# Patient Record
Sex: FEMALE | Race: WHITE | NOT HISPANIC OR LATINO | URBAN - METROPOLITAN AREA
[De-identification: names, ages, dates, MRNs, and addresses within clinical notes are randomized per-mention and may not be internally consistent; named-entity substitution may affect disease eponyms.]

---

## 2020-01-01 ENCOUNTER — INPATIENT (INPATIENT)
Facility: HOSPITAL | Age: 85
LOS: 0 days | DRG: 951 | End: 2020-04-19
Attending: INTERNAL MEDICINE | Admitting: INTERNAL MEDICINE
Payer: MEDICARE

## 2020-01-01 ENCOUNTER — EMERGENCY (EMERGENCY)
Facility: HOSPITAL | Age: 85
LOS: 1 days | Discharge: NURSING, REHAB OR LONG-TERM CARE | End: 2020-01-01
Attending: EMERGENCY MEDICINE | Admitting: EMERGENCY MEDICINE
Payer: MEDICARE

## 2020-01-01 VITALS
SYSTOLIC BLOOD PRESSURE: 70 MMHG | WEIGHT: 110.01 LBS | DIASTOLIC BLOOD PRESSURE: 38 MMHG | HEART RATE: 142 BPM | OXYGEN SATURATION: 88 % | RESPIRATION RATE: 32 BRPM | HEIGHT: 60 IN | TEMPERATURE: 99 F

## 2020-01-01 VITALS
WEIGHT: 100.09 LBS | TEMPERATURE: 98 F | SYSTOLIC BLOOD PRESSURE: 157 MMHG | RESPIRATION RATE: 18 BRPM | HEART RATE: 76 BPM | OXYGEN SATURATION: 97 % | DIASTOLIC BLOOD PRESSURE: 77 MMHG | HEIGHT: 60 IN

## 2020-01-01 VITALS
OXYGEN SATURATION: 100 % | DIASTOLIC BLOOD PRESSURE: 17 MMHG | HEART RATE: 121 BPM | RESPIRATION RATE: 17 BRPM | SYSTOLIC BLOOD PRESSURE: 38 MMHG

## 2020-01-01 VITALS
TEMPERATURE: 97 F | SYSTOLIC BLOOD PRESSURE: 163 MMHG | OXYGEN SATURATION: 99 % | HEART RATE: 80 BPM | DIASTOLIC BLOOD PRESSURE: 94 MMHG | RESPIRATION RATE: 18 BRPM

## 2020-01-01 DIAGNOSIS — J96.00 ACUTE RESPIRATORY FAILURE, UNSPECIFIED WHETHER WITH HYPOXIA OR HYPERCAPNIA: ICD-10-CM

## 2020-01-01 DIAGNOSIS — R41.82 ALTERED MENTAL STATUS, UNSPECIFIED: ICD-10-CM

## 2020-01-01 DIAGNOSIS — Z51.5 ENCOUNTER FOR PALLIATIVE CARE: ICD-10-CM

## 2020-01-01 LAB — SARS-COV-2 RNA SPEC QL NAA+PROBE: DETECTED

## 2020-01-01 PROCEDURE — 72125 CT NECK SPINE W/O DYE: CPT | Mod: 26

## 2020-01-01 PROCEDURE — 99284 EMERGENCY DEPT VISIT MOD MDM: CPT

## 2020-01-01 PROCEDURE — 70450 CT HEAD/BRAIN W/O DYE: CPT | Mod: 26

## 2020-01-01 PROCEDURE — 12011 RPR F/E/E/N/L/M 2.5 CM/<: CPT

## 2020-01-01 PROCEDURE — 99284 EMERGENCY DEPT VISIT MOD MDM: CPT | Mod: 25

## 2020-01-01 PROCEDURE — 72125 CT NECK SPINE W/O DYE: CPT

## 2020-01-01 PROCEDURE — 70450 CT HEAD/BRAIN W/O DYE: CPT

## 2020-01-01 PROCEDURE — 99285 EMERGENCY DEPT VISIT HI MDM: CPT | Mod: CS

## 2020-01-01 RX ORDER — MIRTAZAPINE 45 MG/1
1 TABLET, ORALLY DISINTEGRATING ORAL
Qty: 0 | Refills: 0 | DISCHARGE

## 2020-01-01 RX ORDER — ACETAMINOPHEN 500 MG
650 TABLET ORAL EVERY 6 HOURS
Refills: 0 | Status: DISCONTINUED | OUTPATIENT
Start: 2020-01-01 | End: 2020-01-01

## 2020-01-01 RX ORDER — ACETAMINOPHEN 500 MG
2 TABLET ORAL
Qty: 0 | Refills: 0 | DISCHARGE

## 2020-01-01 RX ORDER — HYDROMORPHONE HYDROCHLORIDE 2 MG/ML
1 INJECTION INTRAMUSCULAR; INTRAVENOUS; SUBCUTANEOUS
Refills: 0 | Status: DISCONTINUED | OUTPATIENT
Start: 2020-01-01 | End: 2020-01-01

## 2020-01-01 RX ORDER — OMEPRAZOLE 10 MG/1
1 CAPSULE, DELAYED RELEASE ORAL
Qty: 0 | Refills: 0 | DISCHARGE

## 2020-01-01 RX ORDER — ATENOLOL 25 MG/1
1 TABLET ORAL
Qty: 0 | Refills: 0 | DISCHARGE

## 2020-01-01 RX ORDER — THIAMINE MONONITRATE (VIT B1) 100 MG
1 TABLET ORAL
Qty: 0 | Refills: 0 | DISCHARGE

## 2020-01-01 RX ORDER — CALCITONIN SALMON 200 [IU]/ML
0 INJECTION, SOLUTION INTRAMUSCULAR
Qty: 0 | Refills: 0 | DISCHARGE

## 2020-01-01 RX ORDER — VENLAFAXINE HCL 75 MG
1 CAPSULE, EXT RELEASE 24 HR ORAL
Qty: 0 | Refills: 0 | DISCHARGE

## 2020-01-01 RX ORDER — ASCORBIC ACID 60 MG
1 TABLET,CHEWABLE ORAL
Qty: 0 | Refills: 0 | DISCHARGE

## 2020-01-01 RX ORDER — ZINC SULFATE TAB 220 MG (50 MG ZINC EQUIVALENT) 220 (50 ZN) MG
0 TAB ORAL
Qty: 0 | Refills: 0 | DISCHARGE

## 2020-01-01 RX ORDER — LANOLIN ALCOHOL/MO/W.PET/CERES
1 CREAM (GRAM) TOPICAL
Qty: 0 | Refills: 0 | DISCHARGE

## 2020-01-01 RX ORDER — SODIUM CHLORIDE 9 MG/ML
0 INJECTION INTRAMUSCULAR; INTRAVENOUS; SUBCUTANEOUS
Qty: 0 | Refills: 0 | DISCHARGE

## 2020-01-01 RX ORDER — TRAZODONE HCL 50 MG
0.5 TABLET ORAL
Qty: 0 | Refills: 0 | DISCHARGE

## 2020-01-01 RX ORDER — LEVOTHYROXINE SODIUM 125 MCG
1 TABLET ORAL
Qty: 0 | Refills: 0 | DISCHARGE

## 2020-01-01 RX ORDER — PREGABALIN 225 MG/1
1 CAPSULE ORAL
Qty: 0 | Refills: 0 | DISCHARGE

## 2020-01-01 RX ORDER — ALPRAZOLAM 0.25 MG
1 TABLET ORAL
Qty: 0 | Refills: 0 | DISCHARGE

## 2020-01-01 RX ADMIN — HYDROMORPHONE HYDROCHLORIDE 1 MILLIGRAM(S): 2 INJECTION INTRAMUSCULAR; INTRAVENOUS; SUBCUTANEOUS at 12:52

## 2020-04-12 NOTE — ED PROVIDER NOTE - CLINICAL SUMMARY MEDICAL DECISION MAKING FREE TEXT BOX
Pt is a 89 yo female s/p fall DNR DNI will get ct head neck laceration repair  at baseline and vitals stable in er not requiring O2

## 2020-04-12 NOTE — ED PROVIDER NOTE - NSFOLLOWUPINSTRUCTIONS_ED_ALL_ED_FT
Possible cervical spine fracture seen on ct scan pt comfort care only   use c-collar for comfort   suture removal 5 days   return to er for any worsening symptoms

## 2020-04-12 NOTE — ED ADULT NURSE NOTE - CAS EDN DISCHARGE ASSESSMENT
Patient baseline mental status No adverse reaction to first time med in ED/Patient baseline mental status

## 2020-04-12 NOTE — ED PROVIDER NOTE - CONSTITUTIONAL, MLM
normal... Well appearing, awake, alert, not oriented to person, place, time/situation responds to touch and in no apparent distress 2 cm laceration left forehead

## 2020-04-12 NOTE — ED ADULT NURSE NOTE - OBJECTIVE STATEMENT
89 yo male presents to the ED BIBA from [facility] with complaints of fall , laceration to left side of head , pt does not respond to name but responds to pain.

## 2020-04-12 NOTE — ED PROVIDER NOTE - ATTENDING CONTRIBUTION TO CARE
89 F with severe dementia sent from [facility] for eval of forehead lac after a fall. Pt unable to give any hx.  PE VSS, NAD, Afebrile, Pulse ox 99% on RA.  $cm lac left forehead, no bony deformity. Neuro unable to assess due to chronic unresponsive state. Plan - Sutures, CT brain, if neg for bleed return to Assisted living.    I performed a history and physical exam of the patient and discussed their management with the advanced care provider. I reviewed the advanced care provider's note and agree with the documented findings and plan of care. My medical decision making and objective findings are found above.

## 2020-04-12 NOTE — ED PROVIDER NOTE - OBJECTIVE STATEMENT
Pt is a 89 yo female with pmhx of dementia hip fracture alzheimer's htn depression and hypothyroidism BIBEMS from [facility] assisted living s/p trip and fall laceration to left forehead no loc. Pt is at baseline mental status as per ems no blood thinners DNI DNR comfort care only. rest of history limited due to mental status history obtained from paperwork.    pcp [place]

## 2020-04-12 NOTE — ED PROVIDER NOTE - PATIENT PORTAL LINK FT
You can access the FollowMyHealth Patient Portal offered by [facility] by registering at the following website: [email or web address]. By joining [employer]’s FollowMyHealth portal, you will also be able to view your health information using other applications (apps) compatible with our system.

## 2020-04-12 NOTE — ED ADULT NURSE NOTE - NSIMPLEMENTINTERV_GEN_ALL_ED
Implemented All Fall with Harm Risk Interventions:  [place] to call system. Call bell, personal items and telephone within reach. Instruct patient to call for assistance. Room bathroom lighting operational. Non-slip footwear when patient is off stretcher. Physically safe environment: no spills, clutter or unnecessary equipment. Stretcher in lowest position, wheels locked, appropriate side rails in place. Provide visual cue, wrist band, yellow gown, etc. Monitor gait and stability. Monitor for mental status changes and reorient to person, place, and time. Review medications for side effects contributing to fall risk. Reinforce activity limits and safety measures with patient and family. Provide visual clues: red socks.

## 2020-04-12 NOTE — ED PROVIDER NOTE - PROGRESS NOTE DETAILS
CT C-spine without contrast  1. Age-indeterminate minimally displaced type II dens fracture with mildly displaced anterior C1 ring fracture. Mild prominence of the prevertebral soft tissues in this region without convincing loss of the prevertebral fat stripe to confirm acuity. Presence of a pacemaker limits MRI assessment.  2. Multilevel degenerative changes, greatest at C5-C6 and C6-C7, as detailed above. results appreciated + pulse ox on forehead seen in CT scan  ct c-spine likely old but no old imaging pt is comfort care only pt nonverbal no further work up indicated at this time will dc with soft collar will d/c back to assisted living results appreciated + pulse ox on forehead seen in CT scan  ct c-spine likely old but no old imaging pt is comfort care only pt nonverbal no further work up indicated at this time will dc with cervical collar will d/c back to assisted living

## 2020-04-19 PROBLEM — I10 ESSENTIAL (PRIMARY) HYPERTENSION: Chronic | Status: ACTIVE | Noted: 2020-01-01

## 2020-04-19 PROBLEM — F41.9 ANXIETY DISORDER, UNSPECIFIED: Chronic | Status: ACTIVE | Noted: 2020-01-01

## 2020-04-19 PROBLEM — E03.9 HYPOTHYROIDISM, UNSPECIFIED: Chronic | Status: ACTIVE | Noted: 2020-01-01

## 2020-04-19 PROBLEM — F03.90 UNSPECIFIED DEMENTIA WITHOUT BEHAVIORAL DISTURBANCE: Chronic | Status: ACTIVE | Noted: 2020-01-01

## 2020-04-19 PROBLEM — E87.1 HYPO-OSMOLALITY AND HYPONATREMIA: Chronic | Status: ACTIVE | Noted: 2020-01-01

## 2020-04-19 NOTE — H&P ADULT - NSHPPHYSICALEXAM_GEN_ALL_CORE
PHYSICAL EXAM:elderly female, frail       Constitutional: frail, unresposive to verbal stimuli    Eyes:discharge or conjunctival injection    ENMT:No pharyngeal exudate or erythema.    Neck: C-collar in place       Respiratory: poor  air entry bilaterally    Cardiovascular:S1 S2 wnl,    Gastrointestinal:Soft BS(+)     Rectal: Deferred     Extremities: mottling bilateral LE,    Vascular: capillary refill >2s    Neurological:AAO X 0       Lymph Nodes:No palpable LNs    Musculoskeletal: functional quadriplegia

## 2020-04-19 NOTE — ED ADULT NURSE NOTE - OBJECTIVE STATEMENT
Patient with history of dementia, brought from nursing home for unresponsiveness. Patient presents unresponsive, mottled, cyanotic, hypotensive and hypoxic with cool extremities and weak peripheral pulses Patient with history of dementia, brought from nursing home for unresponsiveness. Patient presents unresponsive, mottled, cyanotic, hypotensive and hypoxic with cool extremities and weak peripheral pulses. Old scabbed abrasion left upper forehead and patient presents wearing a hard collar for prior c-spine fracture.

## 2020-04-19 NOTE — ED ADULT NURSE NOTE - NS PRO AD PATIENT TYPE
Health Care Proxy (HCP)/DNI/Medical Orders for Life-Sustaining Treatment (MOLST)/Do Not Resuscitate (DNR)

## 2020-04-19 NOTE — ED ADULT NURSE NOTE - PMH
Anxiety    Dementia    Hypertension    Hyponatremia    Hypothyroidism Anxiety    Cervical spine fracture  C-1 s/p fall 4/12/2020  Dementia    Hypertension    Hyponatremia    Hypothyroidism

## 2020-04-19 NOTE — ED PROVIDER NOTE - OBJECTIVE STATEMENT
BIBEMS from [facility] for unresponsiveness today.  As per Rolly, DNR/DNI, no lab, but trial of iv fluid and comfort measures.  pt is tachycardic and unresponsive to pain with her eyes opened.

## 2020-04-19 NOTE — DISCHARGE NOTE FOR THE EXPIRED PATIENT - HOSPITAL COURSE
Pt was a 89 female with a PMH of dementia residing at [facility]. She had recent ER visit with cervical spine fracture s/p fall. She presented to ED on  with altered mental status, hypoxia and hypotension. COVID 19 PCR was positive. Family contacted and chose comfort care/palliative care. No further labs, no IVF, no artificial nutrition, DNR/I.  Pts grave prognosis discussed with son. She was admitted for respiratory failure due to COVID 19 and placed on palliative care with supplemental O2 via NRB and PRN opiates and anxiolytics ordered. She  peacefully at 4:02pm on 2020.

## 2020-04-19 NOTE — CONSULT NOTE ADULT - SUBJECTIVE AND OBJECTIVE BOX
Date/Time Patient Seen:  		  Referring MD:   Data Reviewed	       Patient is a 89y old  Female who presents with a chief complaint of     Subjective/HPI    in bed  seen and examined  vs and HD and sat reviewed  on NRB  imaging reviewed    er provider note reviewed    MOLST reviewed         PAST MEDICAL & SURGICAL HISTORY:  Hypertension  Hypothyroidism  Hyponatremia  Anxiety  Dementia        Medication list         MEDICATIONS  (STANDING):    MEDICATIONS  (PRN):  HYDROmorphone  Injectable 1 milliGRAM(s) IV Push every 30 minutes PRN pain, distress, suffering, palliative measures         Vitals log        ICU Vital Signs Last 24 Hrs  T(C): 37.1 (19 Apr 2020 12:15), Max: 37.1 (19 Apr 2020 12:15)  T(F): 98.8 (19 Apr 2020 12:15), Max: 98.8 (19 Apr 2020 12:15)  HR: 145 (19 Apr 2020 12:20) (142 - 145)  BP: 64/45 (19 Apr 2020 12:20) (64/45 - 70/38)  BP(mean): --  ABP: --  ABP(mean): --  RR: 32 (19 Apr 2020 12:20) (32 - 32)  SpO2: 98% (19 Apr 2020 12:20) (88% - 98%)           Input and Output:  I&O's Detail      Lab Data                  Review of Systems	  frail  weak      Objective     Physical Examination    frail  weak  lung dec BS  heart s1s2      Pertinent Lab findings & Imaging      [place]:  NO   Adequate UO     I&O's Detail           Discussed with:     Cultures:	        Radiology        EXAM:  CT CERVICAL SPINE                          EXAM:  CT BRAIN                                  PROCEDURE DATE:  04/12/2020          INTERPRETATION:  CT HEAD, CT CERVICAL SPINE     History:  89-year-old; fall.    Technique:  Computed tomography of the head and cervical spine was performed without intravenous contrast.    Comparison:  None.    Findings:    Head CT   Brain: No acute intracranial hemorrhage. Encephalomalacia from an old infarct involves much of the right temporal lobe and right occipital lobe and to a lesser extent adjoining right parietal lobe is noted along with marginal gliosis. There is ex vacuo dilatation of the right temporal horn consistent with chronicity. Moderate chronic white matter microvascular ischemic changes are present. No large mass, mass effect, or midline shift is seen. The midline structures are unremarkable. The brain demonstrates unremarkable morphology and volume for age.    Ventricles: No hydrocephalus.  Extra-axial spaces: No subdural or epidural collection. The convexity sulci and basal cisterns are preserved.   Vascular, intracranial: Mild intracranial atherosclerotic changes are noted.    Surgical Changes: None.   Calvarium: The calvarium is intact without focal osseous pathology. No significant scalp contusion identified. A lead is noted to overlie the left frontal scalp without evidence of craniotomy defect or significant subcutaneous extent.  Skull base: The middle ears and mastoid air cells are clear. TMJs are aligned. Cerumen partially impacts the right external auditory canal.    Orbits/facial bones: Non dedicated views of the orbits are grossly unremarkable. No visualized fracture.   Paranasal sinuses: The visualized paranasal sinuses are well aerated. Mild leftward deviation the bony nasal septum.    CT Cervical spine  Alignment: There is moderate prominence of the usual cervical lordosis. No evidence of posttraumatic subluxation. Mild degenerative anterolisthesis of C5 and C6 with minimal degenerative retrolisthesis of C6 on C7.  Vertebral bodies: Age-indeterminate Type II dens fracture, with minimal cephalocaudal separation but without AP displacement. Mildly anteriorly displaced  anterior C1 ring fractures with associated degenerative changes. Questionable prominence of the prevertebral soft tissues at this level with gross preservation of the prevertebral fat stripe. Multilevel mild compression deformities of C4-C7, greatest at C6. Mild multilevel osteopenia.  Intervertebral discs: Moderate loss of intervertebral disc height at C5-C6 and C6-C7 with mild-moderate dorsally directed disc osteophyte complexes without significant compromise of the bony canal.  Neural foramina: Moderate bilateral neural foraminal narrowing from C5-C6 through C6-C7 and mild on the left at C4-C5.  Vascular: The carotid bifurcations straight moderate left greater than right atherosclerotic changes.  Soft tissues: Pre-vertebral soft tissues are unremarkable.  Lungs: The visualized lung apices are clear. A left-sided cardiac dual-lead pacemaker is in place.    Impression:  CT without contrast  1. No acute intracranial hemorrhage, extra-axial collection, hydrocephalus, midline shift or space-occupying mass lesion.  2. 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  3. Chronic and incidental findings as detailed above.    CT C-spine without contrast  1. Age-indeterminate minimally displaced type II dens fracture with mildly displaced anterior C1 ring fracture. Mild prominence of the prevertebral soft tissues in this region without convincing loss of the prevertebral fat stripe to confirm acuity. Presence of a pacemaker limits MRI assessment.  2. Multilevel degenerative changes, greatest at C5-C6 and C6-C7, as detailed above.      I discussed the significant finding in this report via telephone with MACKENZIE STAHL on  4/12/2020 at 1:10 PM .  The findings were acknowledged and  understood. This verbal communication supplements the text report of this document.                  JIMMY PÉREZ   This document has been electronically signed. Apr 12 2020  1:14PM

## 2020-04-19 NOTE — H&P ADULT - NSICDXPASTMEDICALHX_GEN_ALL_CORE_FT
PAST MEDICAL HISTORY:  Anxiety     Cervical spine fracture C-1 s/p fall 4/12/2020    Dementia     Hypertension     Hyponatremia     Hypothyroidism

## 2020-04-19 NOTE — ED ADULT NURSE REASSESSMENT NOTE - NS ED NURSE REASSESS COMMENT FT1
Patient unresponsive with hypotension. Son given permission to come into her private room tospend a few minutes with patient. Son provided PPE and is sitting at bedside at this time. Administration aware. Son has had covid in March and has had recent positive antibody testing.

## 2020-04-19 NOTE — ED ADULT NURSE NOTE - NS PRO AD PATIENT TYPE ON CHART
Health Care Proxy (HCP)/Do Not Resuscitate (DNR)/Medical Orders for Life-Sustaining Treatment (MOLST)/DNI

## 2020-04-19 NOTE — H&P ADULT - HISTORY OF PRESENT ILLNESS
Pt is a 89 female with PMH dementia, recent ED visit 4/12 with cervical fracture. Pt resides at [facility] and was transferred after being found unresponsive. In ED pt noted to be hypoxic, hypotensive and tachycardic. COVID 19 PCR positive. Pts son contacted and confirmed DNR/DNI, comfort care only.

## 2020-04-19 NOTE — CONSULT NOTE ADULT - PROBLEM SELECTOR RECOMMENDATION 9
hypoxemia - dementia - frailty - weakness -   comfort measures as per family -   oral and skin care  TIP reviewed  will add Dilaudid PRN for sx management  supportive palliative measures  pt is DNR DNI as per SON and TIP

## 2020-04-19 NOTE — ED ADULT NURSE REASSESSMENT NOTE - NS ED NURSE REASSESS COMMENT FT1
Patient found apneic and pulseless at 1540, hospitalist called and patient pronounced by Dr. Kirkland at 1609. Son Ortiz Winchester present at bedside at time of expiration. Post mortem care done and [place] [facility] [place]mitting aware of expiration, nursing supervisor Cathleen Bills notified of expiration. Organ donor network deferred, patient + covid. Patient found apneic and pulseless at 1540, hospitalist called and patient pronounced by Dr. Kirkland at 1602. Son Ortiz Winchester present at bedside at time of expiration. Post mortem care done and patient transported [place] [facility] [place]. Admitting aware of expiration, nursing supervisor Cathleen Bills notified of expiration. Organ donor network deferred, patient + covid.

## 2020-04-19 NOTE — H&P ADULT - PROBLEM SELECTOR PLAN 1
Pt to be admitted for comfort care/ palliative care only no further labs, blood draws, DNR/I no IVF  Supportive measures  Supplemental O2   Dilaudid PRN pain/dyspnea  Ativan PRN agitation/anxiety  Prognosis poor  Discussed with pts son Mike at bedside, in agreement with POC as above

## 2020-06-02 PROCEDURE — 99285 EMERGENCY DEPT VISIT HI MDM: CPT

## 2020-06-02 PROCEDURE — 87635 SARS-COV-2 COVID-19 AMP PRB: CPT

## 2024-01-30 NOTE — ED PROVIDER NOTE - SKIN COLOR
MOTTLED [FreeTextEntry1] : Laboratory data, radiology and pathology reviewed in detail at the time of visit.\par